# Patient Record
Sex: MALE | Race: ASIAN | NOT HISPANIC OR LATINO | URBAN - METROPOLITAN AREA
[De-identification: names, ages, dates, MRNs, and addresses within clinical notes are randomized per-mention and may not be internally consistent; named-entity substitution may affect disease eponyms.]

---

## 2020-06-07 ENCOUNTER — EMERGENCY (EMERGENCY)
Facility: HOSPITAL | Age: 55
LOS: 1 days | Discharge: HOME | End: 2020-06-07
Attending: EMERGENCY MEDICINE | Admitting: EMERGENCY MEDICINE
Payer: COMMERCIAL

## 2020-06-07 VITALS
RESPIRATION RATE: 18 BRPM | HEART RATE: 88 BPM | SYSTOLIC BLOOD PRESSURE: 174 MMHG | DIASTOLIC BLOOD PRESSURE: 103 MMHG | OXYGEN SATURATION: 100 % | TEMPERATURE: 99 F

## 2020-06-07 VITALS
DIASTOLIC BLOOD PRESSURE: 102 MMHG | OXYGEN SATURATION: 100 % | HEART RATE: 63 BPM | SYSTOLIC BLOOD PRESSURE: 170 MMHG | RESPIRATION RATE: 16 BRPM | TEMPERATURE: 98 F

## 2020-06-07 LAB
ANION GAP SERPL CALC-SCNC: 12 MMO/L — SIGNIFICANT CHANGE UP (ref 7–14)
BUN SERPL-MCNC: 15 MG/DL — SIGNIFICANT CHANGE UP (ref 7–23)
CALCIUM SERPL-MCNC: 8.6 MG/DL — SIGNIFICANT CHANGE UP (ref 8.4–10.5)
CHLORIDE SERPL-SCNC: 109 MMOL/L — HIGH (ref 98–107)
CO2 SERPL-SCNC: 24 MMOL/L — SIGNIFICANT CHANGE UP (ref 22–31)
CREAT SERPL-MCNC: 0.88 MG/DL — SIGNIFICANT CHANGE UP (ref 0.5–1.3)
GLUCOSE SERPL-MCNC: 121 MG/DL — HIGH (ref 70–99)
MAGNESIUM SERPL-MCNC: 2 MG/DL — SIGNIFICANT CHANGE UP (ref 1.6–2.6)
PHOSPHATE SERPL-MCNC: 2.1 MG/DL — LOW (ref 2.5–4.5)
POTASSIUM SERPL-MCNC: 4 MMOL/L — SIGNIFICANT CHANGE UP (ref 3.5–5.3)
POTASSIUM SERPL-SCNC: 4 MMOL/L — SIGNIFICANT CHANGE UP (ref 3.5–5.3)
SODIUM SERPL-SCNC: 145 MMOL/L — SIGNIFICANT CHANGE UP (ref 135–145)

## 2020-06-07 PROCEDURE — 99283 EMERGENCY DEPT VISIT LOW MDM: CPT

## 2020-06-07 NOTE — ED PROVIDER NOTE - NSFOLLOWUPCLINICS_GEN_ALL_ED_FT
[facility]  Neurology Epilepsy  [address] [place] [id number]  Phone: [phone]  Fax: [phone]  Follow Up Time:     [facility]  Neurology  [address] [place] [address] - [address] [place] [address]  Phone: [phone]  Fax:   Follow Up Time:

## 2020-06-07 NOTE — ED PROVIDER NOTE - PLAN OF CARE
Advance activity as tolerated.  Continue all previously prescribed medications as directed unless otherwise instructed.  Follow up with your primary care physician in 48-72 hours- bring copies of your results.  Return to the ER for worsening or persistent symptoms, and/or ANY NEW OR CONCERNING SYMPTOMS. If you have issues obtaining follow up, please call: [phone] to obtain a doctor or specialist who takes your insurance in your area.  You may call [phone] to make an appointment with the internal medicine clinic.

## 2020-06-07 NOTE — ED PROVIDER NOTE - PHYSICAL EXAMINATION
Gen: Well appearing in NAD  Head: NC/AT  Neck: trachea midline  Resp:  No distress  Ext: no deformities  Neuro:  A&O non focal  Skin:  Warm and dry as visualized  Psych:  Normal affect and mood   EYES: PERRL

## 2020-06-07 NOTE — ED PROVIDER NOTE - PATIENT PORTAL LINK FT
You can access the FollowMyHealth Patient Portal offered by [facility] by registering at the following website: [email or web address]. By joining [employer]’s FollowMyHealth portal, you will also be able to view your health information using other applications (apps) compatible with our system.

## 2020-06-07 NOTE — ED ADULT NURSE NOTE - OBJECTIVE STATEMENT
Pt states he was hit by a closed metal object on the back of head 5 days ago and since then has had intermittent episodes of lt sided hand and foot numbness.  Pt has equal sensation, moves all extremities equally and well, ambulates well, PERRLA, neuro intact.  Pt denies pain, denies visual deficits.

## 2020-06-07 NOTE — ED PROVIDER NOTE - NSFOLLOWUPINSTRUCTIONS_ED_ALL_ED_FT
Advance activity as tolerated.  Continue all previously prescribed medications as directed unless otherwise instructed.  Follow up with your primary care physician in 48-72 hours- bring copies of your results.  Return to the ER for worsening or persistent symptoms, and/or ANY NEW OR CONCERNING SYMPTOMS. If you have issues obtaining follow up, please call: [phone] to obtain a doctor or specialist who takes your insurance in your area.  You may call [phone] to make an appointment with the internal medicine clinic.

## 2020-06-07 NOTE — ED PROVIDER NOTE - OBJECTIVE STATEMENT
54 yr old M with PMHx of HTN (non-compliant with meds) presents to ED with tingling and heaviness of left leg and left arm. 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 No LOC, no blood thinners, no CP, no SOB, no HA, no blurred vision, no dizziness.

## 2020-06-07 NOTE — ED ADULT NURSE NOTE - CHPI ED NUR SYMPTOMS NEG
no blurred vision/no dizziness/no nausea/no numbness/no fever/no vomiting/no loss of consciousness/no confusion/no change in level of consciousness/no weakness

## 2020-06-07 NOTE — ED ADULT TRIAGE NOTE - CHIEF COMPLAINT QUOTE
Arrives ambulatory states something fell and hit the back of his head on thursday and has since had two episodes of numbness in hands and feet, currently having one now. Denies hitting the floor, or LOC, denies blood thinner use. PMH of HTN, noncompliant with medications. Denies headache or blurred vision,

## 2020-06-07 NOTE — ED PROVIDER NOTE - CLINICAL SUMMARY MEDICAL DECISION MAKING FREE TEXT BOX
54 yr old M with symptoms most consistent with post concussive syndrome. Symptoms not consistent with CVA and/or TIA. Will look at electrolytes to ensure no other etiologies. If negative, will provide head injury instructions.

## 2020-06-07 NOTE — ED PROVIDER NOTE - CARE PLAN
Principal Discharge DX:	Paresthesia  Assessment and plan of treatment:	Advance activity as tolerated.  Continue all previously prescribed medications as directed unless otherwise instructed.  Follow up with your primary care physician in 48-72 hours- bring copies of your results.  Return to the ER for worsening or persistent symptoms, and/or ANY NEW OR CONCERNING SYMPTOMS. If you have issues obtaining follow up, please call: [phone] to obtain a doctor or specialist who takes your insurance in your area.  You may call [phone] to make an appointment with the internal medicine clinic.

## 2020-07-01 PROBLEM — I10 ESSENTIAL (PRIMARY) HYPERTENSION: Chronic | Status: ACTIVE | Noted: 2020-06-07

## 2020-07-01 PROBLEM — Z00.00 ENCOUNTER FOR PREVENTIVE HEALTH EXAMINATION: Status: ACTIVE | Noted: 2020-07-01

## 2020-07-02 ENCOUNTER — APPOINTMENT (OUTPATIENT)
Dept: NEUROLOGY | Facility: CLINIC | Age: 55
End: 2020-07-02
Payer: COMMERCIAL

## 2020-07-02 VITALS — BODY MASS INDEX: 26.68 KG/M2 | WEIGHT: 170 LBS | HEIGHT: 67 IN

## 2020-07-02 DIAGNOSIS — I10 ESSENTIAL (PRIMARY) HYPERTENSION: ICD-10-CM

## 2020-07-02 DIAGNOSIS — R20.0 ANESTHESIA OF SKIN: ICD-10-CM

## 2020-07-02 DIAGNOSIS — Z78.9 OTHER SPECIFIED HEALTH STATUS: ICD-10-CM

## 2020-07-02 DIAGNOSIS — Z82.3 FAMILY HISTORY OF STROKE: ICD-10-CM

## 2020-07-02 DIAGNOSIS — S06.0X9A CONCUSSION WITH LOSS OF CONSCIOUSNESS OF UNSPECIFIED DURATION, INITIAL ENCOUNTER: ICD-10-CM

## 2020-07-02 PROCEDURE — 99244 OFF/OP CNSLTJ NEW/EST MOD 40: CPT | Mod: GT

## 2020-07-02 NOTE — HISTORY OF PRESENT ILLNESS
[Home] : at home, [unfilled] , at the time of the visit. [Medical Office: ([facility])___] : at the medical office located in  [FreeTextEntry1] : \par This is a telehealth visit that was performed with the originating site at the patient’s home and the distant site of my office at [address] [place] [address].\par Two way audio and visual technology was used. \par Verbal consent to participate in the telephone/video visit was obtained in lieu of in-person signature due to the coronavirus emergency.\par This particular visit occurred during the 2020 COVID-19 emergency. I discussed with the patient the nature of our telehealth visits, that:\par -I would evaluate the patient and recommend diagnostics and treatments based on my assessment.\par -Our sessions are not being recorded.\par -The patient acknowledged the risk of unsecure transmission of his/her information.\par -Our team would provide follow up care in person if/when the patient needs it.\par \par He reports that on 6/4/20 he was at work and he was hit on the head with a front fender that fell.\par The part hit him in the back of the head.\par He had no loss of consciousness.\par About 10 minutes after the impact he felt numbness in the left leg.\par He also felt dizzy.\par He went to the ED on 6/7 because the numbness in his left leg returned.\par \par He continues to have a sensation of numbness in the left leg and left arm - sometimes both together and sometimes one or the other. Usually the numbness involves the more distal half of each extremity (elbow down and knee down).\par He denies having any weakness. \par \par He initially had a headache but this has resolved.\par Dizziness has also resolved.\par \par He is not having any neck pain. However, he does think that part made contact with the neck as well. He initially had pain.\par \par  [Verbal consent obtained from patient] : the patient, [unfilled]

## 2020-07-02 NOTE — PHYSICAL EXAM
[FreeTextEntry1] : Examination:\par Patient is well appearing\par Neurological Examination:\par Mental Status: Awake, alert. Oriented to person, place, time\par Language: No aphasia\par Cranial Nerves:\par  EOMI, No facial weakness, tongue protrudes in the midline\par Motor Exam: No pronator drift. Barrel roll intact. Fine motor movements intact in hands\par Able to stand up from chair without difficulty\par Able to stand on one leg at a time\par Sensory: intact on self exam\par Reflexes: Unable to examine\par Cerebellar: Finger to nose intact bilaterally\par \par \par

## 2020-07-02 NOTE — DISCUSSION/SUMMARY
[FreeTextEntry1] : 53 yo man who sustained head and neck trauma on 6/4/20.\par Since that time he has had intermittent numbness in his left upper and lower extremity.\par \par A limited examination via televisit is non-focal.\par \par The etiology of his symptoms are unclear.\par He does not have any persistent headache making an intracerebral hemorrhage less likely.\par Nevertheless, MRI brain will be performed.\par I am also ordering MRI cervical spine to look for any cord impingement that may have caused these symptoms. \par \par He is concerned about claustrophobia and would prefer to have an open MRI.\par Diazepam for the MRI was also offered but he has declined. If he changes his mind he will need somebody to drive him to and from the MRI.\par \par He does not have any pain at this time so I am not prescribing any medications.\par \par I advised him to avoid any activities that will result in sudden jerking of his neck.\par \par I will follow up with Mr. Naqvi after his MRI, sooner if needed.\par \par
